# Patient Record
(demographics unavailable — no encounter records)

---

## 2025-03-14 NOTE — DISCUSSION/SUMMARY
[de-identified] : The patient presents today for evaluation regarding left hip and buttock pain.  The physical exam reveals evidence of sciatic notch pain likely secondary to degenerative disc disease lumbar spine stenosis.  I discussed the diagnosis and treatment recommendations.  I recommended a course of Celebrex 200 mg once a day for the next 2 weeks.  Instructions are given regarding taking Celebrex.  If symptoms do not improve I recommend evaluation of her lumbar spine by one of our spine surgeons.  At least 30 minutes was spent performing the evaluation and management on today's office visit.  This includes but is not limited to preparing to see patient including review of any test results or outside medical records, obtaining and/or reviewing separately obtained history, performing examination and evaluation, counseling and educating the patient on their diagnosis and treatment recommendations, ordering medications, tests, or procedures, documenting clinical information in the electronic health record, independently interpreting results (not separately reported) and communicating results to the patient, and coordination of care.

## 2025-03-14 NOTE — HISTORY OF PRESENT ILLNESS
[de-identified] : This patient presents today complaining of left hip and buttock pain.  The pain started last weekend without injury.  Pain is worse when she sits for prolonged periods of time.  She is limping when she gets up.  Pain level 5 out of 10.  She notes stiffness about the hip and the low back.  Pain is improved with rest heat and Advil.  Denies radicular symptoms or numbness and tingling.  Denies any specific injury.  Pain worse with activity and improved with rest.

## 2025-03-14 NOTE — PHYSICAL EXAM
[de-identified] : The patient appears well nourished  and in no apparent distress.  The patient is alert and oriented to person, place, and time.   Affect and mood appear normal.    The head is normocephalic and atraumatic.  The eyes reveal normal sclera and extra ocular muscles are intact.   The neck appears normal with no jugular venous distention or masses noted.   Skin shows normal turgor with no evidence of eczema or psoriasis.  No respiratory distress noted.  The patient ambulates with an antalgic gait.  Exam of the left hip reveals full range of motion without pain.  There is no tenderness to palpation of the hip at the trochanter.  There is no soft tissue swelling.  There is no ecchymosis.  There is no warmth erythema.  There are no masses palpable.  She does have tenderness to palpation over the sciatic notch.  Good strength and sensation intact distally.  Pulses are intact distally. [de-identified] : X-ray of the pelvis and a frog lateral view of the left hip were obtained.  There is narrowing of the joint spaces bilaterally at the hips.  No evidence of any significant degenerative arthritis is noted.  No evidence of any fracture noted.  No subluxation or dislocation about the hips noted.  SI joints appear intact.